# Patient Record
Sex: MALE | Race: WHITE
[De-identification: names, ages, dates, MRNs, and addresses within clinical notes are randomized per-mention and may not be internally consistent; named-entity substitution may affect disease eponyms.]

---

## 2019-01-23 ENCOUNTER — HOSPITAL ENCOUNTER (EMERGENCY)
Dept: HOSPITAL 91 - FTE | Age: 54
Discharge: HOME | End: 2019-01-23
Payer: MEDICAID

## 2019-01-23 ENCOUNTER — HOSPITAL ENCOUNTER (EMERGENCY)
Dept: HOSPITAL 10 - FTE | Age: 54
Discharge: HOME | End: 2019-01-23
Payer: MEDICAID

## 2019-01-23 VITALS
WEIGHT: 169.76 LBS | HEIGHT: 66 IN | BODY MASS INDEX: 27.28 KG/M2 | WEIGHT: 169.76 LBS | BODY MASS INDEX: 27.28 KG/M2 | HEIGHT: 66 IN

## 2019-01-23 VITALS — SYSTOLIC BLOOD PRESSURE: 136 MMHG | HEART RATE: 72 BPM | RESPIRATION RATE: 18 BRPM | DIASTOLIC BLOOD PRESSURE: 90 MMHG

## 2019-01-23 DIAGNOSIS — R07.9: ICD-10-CM

## 2019-01-23 DIAGNOSIS — R49.0: Primary | ICD-10-CM

## 2019-01-23 LAB
ADD MAN DIFF?: NO
ADD UMIC: NO
ALANINE AMINOTRANSFERASE: 31 IU/L (ref 13–69)
ALBUMIN/GLOBULIN RATIO: 1.36
ALBUMIN: 4.5 G/DL (ref 3.3–4.9)
ALKALINE PHOSPHATASE: 105 IU/L (ref 42–121)
ANION GAP: 11 (ref 5–13)
ASPARTATE AMINO TRANSFERASE: 29 IU/L (ref 15–46)
BASOPHIL #: 0.1 10^3/UL (ref 0–0.1)
BASOPHILS %: 0.5 % (ref 0–2)
BILIRUBIN,DIRECT: 0 MG/DL (ref 0–0.2)
BILIRUBIN,TOTAL: 0 MG/DL (ref 0.2–1.3)
BLOOD UREA NITROGEN: 18 MG/DL (ref 7–20)
CALCIUM: 9.4 MG/DL (ref 8.4–10.2)
CARBON DIOXIDE: 24 MMOL/L (ref 21–31)
CHLORIDE: 105 MMOL/L (ref 97–110)
CREATININE: 0.93 MG/DL (ref 0.61–1.24)
EOSINOPHILS #: 0.3 10^3/UL (ref 0–0.5)
EOSINOPHILS %: 2.7 % (ref 0–7)
GLOBULIN: 3.3 G/DL (ref 1.3–3.2)
GLUCOSE: 105 MG/DL (ref 70–220)
HEMATOCRIT: 49.5 % (ref 42–52)
HEMOGLOBIN: 16.9 G/DL (ref 14–18)
IMMATURE GRANS #M: 0.03 10^3/UL (ref 0–0.03)
IMMATURE GRANS % (M): 0.3 % (ref 0–0.43)
LYMPHOCYTES #: 3.2 10^3/UL (ref 0.8–2.9)
LYMPHOCYTES %: 31.5 % (ref 15–51)
MEAN CORPUSCULAR HEMOGLOBIN: 30.9 PG (ref 29–33)
MEAN CORPUSCULAR HGB CONC: 34.1 G/DL (ref 32–37)
MEAN CORPUSCULAR VOLUME: 90.5 FL (ref 82–101)
MEAN PLATELET VOLUME: 9.1 FL (ref 7.4–10.4)
MONOCYTE #: 0.7 10^3/UL (ref 0.3–0.9)
MONOCYTES %: 7 % (ref 0–11)
NEUTROPHIL #: 6 10^3/UL (ref 1.6–7.5)
NEUTROPHILS %: 58 % (ref 39–77)
NUCLEATED RED BLOOD CELLS #: 0 10^3/UL (ref 0–0)
NUCLEATED RED BLOOD CELLS%: 0 /100WBC (ref 0–0)
PLATELET COUNT: 305 10^3/UL (ref 140–415)
POTASSIUM: 4.1 MMOL/L (ref 3.5–5.1)
RED BLOOD COUNT: 5.47 10^6/UL (ref 4.7–6.1)
RED CELL DISTRIBUTION WIDTH: 12.2 % (ref 11.5–14.5)
SODIUM: 140 MMOL/L (ref 135–144)
TOTAL PROTEIN: 7.8 G/DL (ref 6.1–8.1)
TROPONIN-I: < 0.012 NG/ML (ref 0–0.12)
UR ASCORBIC ACID: NEGATIVE MG/DL
UR BILIRUBIN (DIP): NEGATIVE MG/DL
UR BLOOD (DIP): NEGATIVE MG/DL
UR CLARITY: CLEAR
UR COLOR: COLORLESS
UR GLUCOSE (DIP): NEGATIVE MG/DL
UR KETONES (DIP): NEGATIVE MG/DL
UR LEUKOCYTE ESTERASE (DIP): NEGATIVE LEU/UL
UR NITRITE (DIP): NEGATIVE MG/DL
UR PH (DIP): 7 (ref 5–9)
UR SPECIFIC GRAVITY (DIP): 1.01 (ref 1–1.03)
UR TOTAL PROTEIN (DIP): NEGATIVE MG/DL
UR UROBILINOGEN (DIP): NEGATIVE MG/DL
WHITE BLOOD COUNT: 10.3 10^3/UL (ref 4.8–10.8)

## 2019-01-23 PROCEDURE — 36415 COLL VENOUS BLD VENIPUNCTURE: CPT

## 2019-01-23 PROCEDURE — 85025 COMPLETE CBC W/AUTO DIFF WBC: CPT

## 2019-01-23 PROCEDURE — 71046 X-RAY EXAM CHEST 2 VIEWS: CPT

## 2019-01-23 PROCEDURE — 80053 COMPREHEN METABOLIC PANEL: CPT

## 2019-01-23 PROCEDURE — 93005 ELECTROCARDIOGRAM TRACING: CPT

## 2019-01-23 PROCEDURE — 84484 ASSAY OF TROPONIN QUANT: CPT

## 2019-01-23 PROCEDURE — 81003 URINALYSIS AUTO W/O SCOPE: CPT

## 2019-01-23 PROCEDURE — 99285 EMERGENCY DEPT VISIT HI MDM: CPT

## 2019-01-23 RX ADMIN — CEPHALEXIN 1 MG: 500 CAPSULE ORAL at 17:17

## 2019-01-23 RX ADMIN — SULFAMETHOXAZOLE AND TRIMETHOPRIM 1 TAB: 800; 160 TABLET ORAL at 17:17

## 2019-01-23 NOTE — ERD
ER Documentation


Chief Complaint


Chief Complaint





CONGESTION ST HOARSENESS IN VOICE





HPI


This is a 53-year-old male presenting to the emergency department complaining of


hoarseness in his voice for the past couple months.  Patient does admit to 


intermittent cough and congestion.  Denies chest pain or shortness of breath.  


Patient also states that he has a lesion on his abdomen and underneath his 


axillary that he is noted for the past 2 days which is painful.  He rates the 


pain moderate to moderate in severity.  She has no history of any heart disease.


 Denies dyspnea





ROS


All systems reviewed and are negative except as per history of present illness.





Medications


Home Meds


Active Scripts


Doxycycline Hyclate* (Doxycycline Hyclate*) 100 Mg Tablet., 100 MG PO BID for 


10 Days, TAB


   Prov:EDE CESARC         1/23/19


Cephalexin* (Keflex*) 500 Mg Capsule, 500 MG PO QID for 7 Days, CAP


   Prov:EDE CESARC         1/23/19


Famotidine* (Pepcid*) 20 Mg Tablet, 20 MG PO QHS for 4 Days, TAB


   Prov:EDE CESARC         1/23/19


Omeprazole* (Omeprazole*) 20 Mg Capsule., 20 MG PO DAILY, #15


   Prov:EDE CESAR PA-C         1/23/19





Allergies


Allergies:  


Coded Allergies:  


     No Known Allergy (Unverified , 1/23/19)





PMhx/Soc


Medical and Surgical Hx:  pt denies Medical Hx, pt denies Surgical Hx


Hx Alcohol Use:  No


Hx Substance Use:  No


Hx Tobacco Use:  No





Physical Exam


Vitals





Vital Signs


  Date      Temp  Pulse  Resp  B/P (MAP)   Pulse Ox  O2          O2 Flow    FiO2


Time                                                 Delivery    Rate


   1/23/19  98.4     72    18      136/90        98  Room Air


     19:19                          (105)


   1/23/19  97.9    112    19      144/90        99


     14:54                          (108)





Physical Exam


GENERAL: no acute distress, non-toxic appearing, sitting up in bed


HENT: normocephalic/atraumatic


EYES: conjunctiva is normal


NECK: no noticeable or palpable swelling, no carotid bruits, no JVD


CARDIOVASCULAR: RRR, good S1S2, no murmurs or gallops heard


PULM: clear to auscultation, no use of accessory muscles, no crackles or 


wheezes. 


ABDOMEN: normal bowel sounds, abdomen soft and nontender 


EXT: no edema, cyanosis or clubbing 


MUSCULOSKELETAL: 5/5 strength, normal range of motion, no swollen or 


erythematous joints. 


NEURO: alert and oriented


SKIN: 3cm indurated papule under right axillary and abdomen


BREAST: breast exam was not relevant, therefore not preformed


PSYCH: normal mood and mentation, denies suicidal or homicidal ideation and 


thoughts


Result Diagram:  


1/23/19 1714                                                                    


           1/23/19 1714





Results 24 hrs





Laboratory Tests


              Test
                                  1/23/19
17:14


              White Blood Count                      10.3 10^3/ul


              Red Blood Count                        5.47 10^6/ul


              Hemoglobin                                16.9 g/dl


              Hematocrit                                   49.5 %


              Mean Corpuscular Volume                     90.5 fl


              Mean Corpuscular Hemoglobin                 30.9 pg


              Mean Corpuscular Hemoglobin
Concent      34.1 g/dl 



              Red Cell Distribution Width                  12.2 %


              Platelet Count                          305 10^3/UL


              Mean Platelet Volume                         9.1 fl


              Immature Granulocytes %                     0.300 %


              Neutrophils %                                58.0 %


              Lymphocytes %                                31.5 %


              Monocytes %                                   7.0 %


              Eosinophils %                                 2.7 %


              Basophils %                                   0.5 %


              Nucleated Red Blood Cells %             0.0 /100WBC


              Immature Granulocytes #               0.030 10^3/ul


              Neutrophils #                           6.0 10^3/ul


              Lymphocytes #                           3.2 10^3/ul


              Monocytes #                             0.7 10^3/ul


              Eosinophils #                           0.3 10^3/ul


              Basophils #                             0.1 10^3/ul


              Nucleated Red Blood Cells #             0.0 10^3/ul


              Urine Color                          COLORLESS


              Urine Clarity                        CLEAR


              Urine pH                                        7.0


              Urine Specific Gravity                        1.006


              Urine Ketones                        NEGATIVE mg/dL


              Urine Nitrite                        NEGATIVE mg/dL


              Urine Bilirubin                      NEGATIVE mg/dL


              Urine Urobilinogen                   NEGATIVE mg/dL


              Urine Leukocyte Esterase
            NEGATIVE
Tee/ul


              Urine Hemoglobin                     NEGATIVE mg/dL


              Urine Glucose                        NEGATIVE mg/dL


              Urine Total Protein                  NEGATIVE mg/dl


              Sodium Level                             140 mmol/L


              Potassium Level                          4.1 mmol/L


              Chloride Level                           105 mmol/L


              Carbon Dioxide Level                      24 mmol/L


              Anion Gap                                        11


              Blood Urea Nitrogen                        18 mg/dl


              Creatinine                               0.93 mg/dl


              Est Glomerular Filtrat Rate
mL/min   > 60 mL/min 



              Glucose Level                             105 mg/dl


              Calcium Level                             9.4 mg/dl


              Total Bilirubin                           0.0 mg/dl


              Direct Bilirubin                         0.00 mg/dl


              Indirect Bilirubin                        0.0 mg/dl


              Aspartate Amino Transf
(AST/SGOT)          29 IU/L 



              Alanine Aminotransferase
(ALT/SGPT)        31 IU/L 



              Alkaline Phosphatase                       105 IU/L


              Troponin I                           < 0.012 ng/ml


              Total Protein                              7.8 g/dl


              Albumin                                    4.5 g/dl


              Globulin                                  3.30 g/dl


              Albumin/Globulin Ratio                         1.36





Current Medications


 Medications
   Dose
          Sig/Ernesto
       Start Time
   Status  Last


 (Trade)       Ordered        Route
 PRN     Stop Time              Admin
Dose


                              Reason                                Admin


 Cephalexin
    500 mg         ONCE  ONCE
    1/23/19       DC           1/23/19


(Keflex)                      PO
            17:30
                       17:17



                                             1/23/19 17:31


                1 tab          ONCE  ONCE
    1/23/19       DC           1/23/19


Trimethoprim/                 PO
            17:30
                       17:17




                                            1/23/19 17:31


Sulfamethoxaz


ole



(Bactrim


(Ds))








Procedures/MDM


This is a well-appearing, pleasant 53-year-old male presenting to the emergency 


department with multiple complaints.  Patient complains of indurated papule on 


his right axilla and abdomen, likely to be infected cyst vs folliculitis, was 


given prescription for antibiotics doxycycline Keflex.  I have instructed him to


follow-up with his dermatologist and return to emerge department for any 


worsening symptoms.  In addition patient states that he feels that he has 


coarseness in his voice and congestion for the past month.  Low suspicion for 


ACS or other court-appointed conditions.  Patient will be treated for gastritis,


he was given prescription for Pepcid and omeprazole and instructions to change 


his diet.  Chest x-ray was done and radiologist stated left base atelectasis 


without any signs of infiltrates. I have instructed him to follow-up with his 


primary care physician for referral for cardiologist and pulmonologist.  Return 


precautions have been given he understands agrees this plan














Discussed this case with my supervising physician 





Departure


Diagnosis:  


   Primary Impression:  


   Hoarseness


Condition:  Stable


Patient Instructions:  Atelectasis, Gastritis (Adult), Laryngitis





Additional Instructions:  


PLEASE FOLLOW UP WITH A PRIMARY CARE PHYSICIAN. 





Take all medicines as directed.





Return to this facility if you are not improving as expected.











EDE CESAR PA-C      Jan 23, 2019 18:35

## 2019-04-10 ENCOUNTER — HOSPITAL ENCOUNTER (EMERGENCY)
Dept: HOSPITAL 10 - FTE | Age: 54
Discharge: HOME | End: 2019-04-10
Payer: MEDICAID

## 2019-04-10 ENCOUNTER — HOSPITAL ENCOUNTER (EMERGENCY)
Dept: HOSPITAL 91 - FTE | Age: 54
Discharge: HOME | End: 2019-04-10
Payer: MEDICAID

## 2019-04-10 VITALS
WEIGHT: 165.35 LBS | HEIGHT: 67 IN | BODY MASS INDEX: 25.95 KG/M2 | WEIGHT: 165.35 LBS | HEIGHT: 67 IN | BODY MASS INDEX: 25.95 KG/M2

## 2019-04-10 VITALS — SYSTOLIC BLOOD PRESSURE: 157 MMHG | HEART RATE: 115 BPM | RESPIRATION RATE: 18 BRPM | DIASTOLIC BLOOD PRESSURE: 100 MMHG

## 2019-04-10 DIAGNOSIS — M65.341: Primary | ICD-10-CM

## 2019-04-10 DIAGNOSIS — L02.511: ICD-10-CM

## 2019-04-10 PROCEDURE — 99283 EMERGENCY DEPT VISIT LOW MDM: CPT

## 2019-04-10 PROCEDURE — 26010 DRAINAGE OF FINGER ABSCESS: CPT

## 2019-04-10 RX ADMIN — SULFAMETHOXAZOLE AND TRIMETHOPRIM 1 TAB: 800; 160 TABLET ORAL at 14:50

## 2019-04-10 RX ADMIN — ACETAMINOPHEN 1 MG: 325 TABLET, FILM COATED ORAL at 14:51

## 2019-04-10 NOTE — ERD
ER Documentation


Chief Complaint


Chief Complaint





swelling on the left chin area





HPI


53-year-old male presents with multiple complaints





His right fourth digit he has had intermittent catching with his right fourth 


digit caught in flexion.  He is able to extend it manually.  It is actually 


improving over the last week.  Denies any history of trauma, redness, additional


symptoms.  Is an additional complaint of some redness on his right chin over the


last week.  Has had a previous similar lesions which she popped and expressed 


blood.  Denies any measured fevers, vomiting, shortness breath or chest pain.





ROS


All systems reviewed and are negative except as per history of present illness.





Medications


Home Meds


Active Scripts


Ibuprofen* (Motrin*) 600 Mg Tab, 600 MG PO Q6, #20 TAB


   Prov:ELIZABETH RIBEIRO MD         4/10/19


Sulfamethoxazole/Trimethoprim* (Bactrim Ds* Tablet) 1 Each Tablet, 1 TAB PO BID 


for 7 Days, #14 TAB


   Prov:ELIZABETH RIBEIRO MD         4/10/19


Doxycycline Hyclate* (Doxycycline Hyclate*) 100 Mg Tablet., 100 MG PO BID for 


10 Days, TAB


   Prov:EDE CESARC         1/23/19


Cephalexin* (Keflex*) 500 Mg Capsule, 500 MG PO QID for 7 Days, CAP


   Prov:EDE CESAR PA-C         1/23/19


Famotidine* (Pepcid*) 20 Mg Tablet, 20 MG PO QHS for 4 Days, TAB


   Prov:EDE CESAR PA-C         1/23/19


Omeprazole* (Omeprazole*) 20 Mg Capsule., 20 MG PO DAILY, #15


   Prov:EDE CESAR PA-C         1/23/19





Allergies


Allergies:  


Coded Allergies:  


     No Known Allergy (Unverified , 1/23/19)





PMhx/Soc


Medical and Surgical Hx:  pt denies Medical Hx, pt denies Surgical Hx


Hx Alcohol Use:  No


Hx Substance Use:  No


Hx Tobacco Use:  No





Physical Exam


Vitals





Vital Signs


  Date      Temp  Pulse  Resp  B/P (MAP)   Pulse Ox  O2          O2 Flow    FiO2


Time                                                 Delivery    Rate


   4/10/19  98.3    115    18     157/100        98


     13:52                          (119)





Physical Exam


Const:   No acute distress


Head:   Atraumatic 


Eyes:    Normal Conjunctiva


ENT:    Normal External Ears, Nose and Mouth.  Induration and central softening 


of the right Chin redness.  Airway patent.


Neck:               Full range of motion. No meningismus.


Resp:   Clear to auscultation bilaterally


Cardio:   Regular rate and rhythm, no murmurs


Abd:    Soft, non tender, non distended. Normal bowel sounds


Skin:   No petechiae or rashes


Back:   No midline or flank tenderness


Ext:    No cyanosis, or edema.  Minimal tenderness in the right volar PIP joint 


of the right fourth digit but no erythema, proximal tendon tenderness, 


deformities, restricted range of motion or weakness.


Neur:   Awake and alert


Psych:    Normal Mood and Affect


Results 24 hrs





Current Medications


 Medications
   Dose
          Sig/Ernesto
       Start Time
   Status  Last


 (Trade)       Ordered        Route
 PRN     Stop Time              Admin
Dose


                              Reason                                Admin


                650 mg         ONCE  ONCE
    4/10/19       DC           4/10/19


Acetaminophen                 PO
            14:30
                       14:51




  (Tylenol                                  4/10/19 14:31


Tab)


                1 tab          ONCE  ONCE
    4/10/19       DC           4/10/19


Trimethoprim/                 PO
            14:30
                       14:50




                                            4/10/19 14:31


Sulfamethoxaz


ole



(Bactrim


(Ds))








Procedures/MDM


Patient presents with history of signs and symptoms consistent with trigger 


finger in her right fourth digit.  Is nontraumatic.  X-rays deferred given no 


evidence of trauma no signs of infection or additional concerning signs or 


symptoms.  He was placed in a right fourth digit metal splint was neurovascular 


intact after splint.  He is advised to use splint for 2 weeks, follow-up with 


primary doctor and orthopedist for further evaluation for persistent symptoms.  


He has no signs of tenosynovitis, ostium myelitis, history of stress fracture, 


dislocation, ischemia or deficits.





Patient has signs and symptoms of right shin abscess.  There is no evidence of 


airway obstruction, signs of necrotizing fasciitis.





Procedure note-2 cc lidocaine was used for local filtration on the right shin.  


#11 scalpel was used to incise the wound.  Small amount of pus was expressed 


with loculations broken up with a probe.  Wound was dressed.  Wound is not 


packed given small size of lesion.








Patient will be treated with ibuprofen, Bactrim and, recommendations for 2-day 


recheck for worsening redness, fevers, new worsening symptoms.





The patient was stable with no new complaints during the ER course. Clinically, 


there is no current evidence to suggest meningitis, sepsis, acute abdomen, 


pneumonia, stroke,  acute coronary syndrome, pulmonary embolism, aortic 


dissection or any other emergent condition appearing to require further 


evaluation or hospitalization.  Patient counseled regarding my diagnostic 


impression and care plan. Prior to discharge all questions answered. Pt agrees 


with treatment plan and understands strict return precautions. Pt is instructed 


to follow up with primary care provider within 24-48 hours. Precautionary 


instructions provided including instructions to return to the ER if not 


improving or for any worsening or changing symptoms or concerns.





Departure


Diagnosis:  


   Primary Impression:  


   Trigger finger of right hand


   Trigger finger location:  ring finger  Qualified Codes:  M65.341 - Trigger 


   finger, right ring finger


   Additional Impression:  


   Abscess


Condition:  Stable


Patient Instructions:  What Is Trigger Finger?, Treating Trigger Finger, 


Abscess, Incision And Drainage





Additional Instructions:  


Recheck in 2-3 days for worsening redness, swelling, fevers.  See orthopedic or 


primary care doctor for evaluation of finger.  Recommend use splint for 2 weeks.


 Recheck sooner for redness, fevers, new worsening symptoms.











ELIZABETH RIBEIRO MD             Apr 10, 2019 14:59